# Patient Record
Sex: MALE | Race: WHITE | Employment: FULL TIME | URBAN - METROPOLITAN AREA
[De-identification: names, ages, dates, MRNs, and addresses within clinical notes are randomized per-mention and may not be internally consistent; named-entity substitution may affect disease eponyms.]

---

## 2022-03-03 ENCOUNTER — OFFICE VISIT (OUTPATIENT)
Dept: ORTHOPEDIC SURGERY | Age: 57
End: 2022-03-03
Payer: COMMERCIAL

## 2022-03-03 VITALS — WEIGHT: 195 LBS | BODY MASS INDEX: 27.92 KG/M2 | HEIGHT: 70 IN

## 2022-03-03 DIAGNOSIS — M25.562 LEFT KNEE PAIN, UNSPECIFIED CHRONICITY: ICD-10-CM

## 2022-03-03 DIAGNOSIS — M17.12 PRIMARY OSTEOARTHRITIS OF LEFT KNEE: Primary | ICD-10-CM

## 2022-03-03 PROCEDURE — 99204 OFFICE O/P NEW MOD 45 MIN: CPT | Performed by: ORTHOPAEDIC SURGERY

## 2022-03-03 RX ORDER — MELOXICAM 15 MG/1
15 TABLET ORAL DAILY PRN
Qty: 30 TABLET | Refills: 0 | Status: SHIPPED | OUTPATIENT
Start: 2022-03-03

## 2022-03-03 NOTE — PROGRESS NOTES
ORTHOPAEDIC SURGERY H&P / CONSULTATION NOTE    Chief complaint:   Chief Complaint   Patient presents with    Knee Pain     left knee pain        History of present illness: The patient is a 61 y.o. male with subjective symptoms of left knee pain. The chief complaint is located at lateral aspect left knee, occasional anterior aspect. Duration of symptoms has been for 10 days. The severity of symptoms is rated at 8/10 pain on intake form. Patient states that he had twisted his knee. He has discomfort and pain and has known osteoarthritis in the knee. He states pain with range of motion. He has been using Motrin and occasional brace use. He feels that he has had some discomfort with golfing and rowing. He is done formal physical therapy in the past.  He has had previous viscosupplementation injection but nothing of recent. The patient has tried the below listed items prior to today's consultation for above listed chief complaint.  +   Over-the-counter anti-inflammatories/prescription medication anti-inflammatory. -   Physical therapy / guided home exercise program of recent     -   Previous corticosteroid injections    Past medical history:    Past Medical History:   Diagnosis Date    Arthritis     knees    BBB (bundle branch block)     Wears glasses         Past surgical history:    Past Surgical History:   Procedure Laterality Date    KNEE ARTHROSCOPY Right 2007    VASECTOMY      WISDOM TOOTH EXTRACTION          Allergies:  No Known Allergies      Medications:   Current Outpatient Medications:     meloxicam (MOBIC) 15 MG tablet, Take 1 tablet by mouth daily as needed for Pain, Disp: 30 tablet, Rfl: 0    meloxicam (MOBIC) 15 MG tablet, Take 1 tablet by mouth daily, Disp: 30 tablet, Rfl: 0    ibuprofen (ADVIL;MOTRIN) 200 MG CAPS, Take 1 capsule by mouth, Disp: , Rfl:      Social history: Denies IV drug use.     Social History     Socioeconomic History    Marital status:      Spouse name: Not on file    Number of children: Not on file    Years of education: Not on file    Highest education level: Not on file   Occupational History    Not on file   Tobacco Use    Smoking status: Never Smoker    Smokeless tobacco: Never Used   Substance and Sexual Activity    Alcohol use: Yes     Comment: daily    Drug use: No    Sexual activity: Not on file   Other Topics Concern    Not on file   Social History Narrative    Not on file     Social Determinants of Health     Financial Resource Strain:     Difficulty of Paying Living Expenses: Not on file   Food Insecurity:     Worried About Running Out of Food in the Last Year: Not on file    Andre of Food in the Last Year: Not on file   Transportation Needs:     Lack of Transportation (Medical): Not on file    Lack of Transportation (Non-Medical): Not on file   Physical Activity:     Days of Exercise per Week: Not on file    Minutes of Exercise per Session: Not on file   Stress:     Feeling of Stress : Not on file   Social Connections:     Frequency of Communication with Friends and Family: Not on file    Frequency of Social Gatherings with Friends and Family: Not on file    Attends [employer] Services: Not on file    Active Member of [address] [place] [address] or Organizations: Not on file    Attends Club or Organization Meetings: Not on file    Marital Status: Not on file   Intimate Partner Violence:     Fear of Current or Ex-Partner: Not on file    Emotionally Abused: Not on file    Physically Abused: Not on file    Sexually Abused: Not on file   Housing Stability:     Unable to Pay for Housing in the Last Year: Not on file    Number of [place] in the Last Year: Not on file    Unstable Housing in the Last Year: Not on file     Tobacco use.     Social History     Tobacco Use   Smoking Status Never Smoker   Smokeless Tobacco Never Used     Employment: Noncontributory    Workers compensation claim: Noncontributory    Review of systems: Patient denies any fevers chills chest pain shortness of breath nausea vomiting significant weight loss any change in voiding or bowel movements. Patient denies any significant numbness or tingling at baseline as it relates to this presenting symptom/chief complaint. The patient denies any significant problems with skin or any significant allergies. Physical examination:  Body mass index is 27.98 kg/m². AAOx3, NCAT  EOMI  MMM  RR  Unlabored breathing, no wheezing  Skin intact BUE and BLE, warm and moist  Bilateral lower extremity examination specific to subjective symptoms  Exam Left Lower Extremity  Trace effusion,      0/120/0 active ROM (E/F/Lag), same P assive ROM (E/F/Lag), negative anterior Drawer, negative Posterior Drawer,  Stable varus/valgus at 0 and 30?,    none TTP Joint Line, negative Gabriela,    Skin intact throughout  5/5 IP Q H TA G EHL  SILT DP SP LP MP S S  +2 DP pulse    Diagnostic imaging:  MY READ:  Radiographs 4 view left knee 3/3/2022: Positive arthrosis mild to moderate patellofemoral greater than medial/lateral    Pertinent lab work: None     Diagnosis Orders   1. Primary osteoarthritis of left knee  meloxicam (MOBIC) 15 MG tablet    [facility]   2. Left knee pain, unspecified chronicity  XR KNEE LEFT (MIN 4 VIEWS)       Assessment and plan: 61 y.o. male with current subjective symptoms and physical exam findings with diagnostic imaging correlating to left knee osteoarthritis. -Time of 16 minutes was spent coordinating and discussing the clinical findings, reviewing diagnostic imaging as indicated, coordinating care with prior notes review and current clinical encounter documentation as it pertains to the patient's presenting subjective symptoms and diagnoses. -I reviewed with the patient the imaging findings as well as clinical exam and  how it correlates to subjective symptoms.  -I had a pleasant discussion with the patient today.   I reviewed with him directly his radiographs. He does have mild to moderate patellofemoral arthrosis greater than medial/lateral  -I reviewed with him conservative care treatment options. At this time we will attempt to address this with Mobic 15 mg p.o. daily as needed pain  -Formal physical therapy is also been prescribed to work on NADINE knee reconditioning and strengthening.  -Pending the results of conservative care, I did review with him consideration for viscosupplementation injection therapy given the moderate nature of his knee osteoarthritis precludes the likely efficacy of leukocyte poor PRP injection therapy. I also did not advocate for corticosteroid injections at this time.  -He is going to keep an eye on his symptoms and as well as low impact activity elliptical stationary bike swimming and walking. Pending the results of this, he will contact the office in 4 to 6 weeks for consideration of viscosupplementation injection therapy series to be submitted to insurance for authorization  -All questions answered to the patient's satisfaction and the patient expressed understanding and agreement with the above listed treatment plan  -Follow up in 4 to 6 weeks as needed  -Thank you for the clinical consultation and allowing me to participate in the patient's care. Electronically signed by Kingsley Myles MD on 3/3/22 at 5:01 PM EST         Kingsley Myles MD       Orthopaedic Surgery-Sports Medicine        Disclaimer: This note was dictated with voice recognition software. Though review and correction are routinely performed, please contact the office/medical records for any errors requiring correction.

## 2022-03-07 ENCOUNTER — HOSPITAL ENCOUNTER (OUTPATIENT)
Dept: PHYSICAL THERAPY | Age: 57
Setting detail: THERAPIES SERIES
Discharge: HOME | End: 2022-03-07
Payer: COMMERCIAL

## 2022-03-07 PROCEDURE — 97110 THERAPEUTIC EXERCISES: CPT | Performed by: PHYSICAL THERAPIST

## 2022-03-07 PROCEDURE — 97161 PT EVAL LOW COMPLEX 20 MIN: CPT | Performed by: PHYSICAL THERAPIST

## 2022-03-07 NOTE — FLOWSHEET NOTE
[facility],  [address]  Phone: [phone]  Fax [phone]    Physical Therapy Treatment Note/ Progress Report:           Date:  3/7/2022    Patient Name:  Francis Rueda    :  1965  MRN: [id number]  Restrictions/Precautions:    Medical/Treatment Diagnosis Information:  · Diagnosis: M17.12 (ICD-10-CM) - Primary osteoarthritis of left knee  ·   Treatment diagnosis:Left knee pain M25.562, Left knee stiffness M25.662, impaired gait U34.9  Insurance/Certification information:  PT Insurance Information:  [address] DED$500 80/20% 30PT/OT/SP NO AUTH [place] MED  Physician Information:  Referring Practitioner: Jasmina Yuen MD  Has the plan of care been signed (Y/N):        []  Yes  [x]  No     Date of Patient follow up with Physician: PRN      Is this a Progress Report:     []  Yes  [x]  No        If Yes:  Date Range for reporting period:  Beginning 3/7/22  Ending    Progress report will be due (10 Rx or 30 days whichever is less): 48       Recertification will be due (POC Duration  / 90 days whichever is less): 6 weeks      Visit # Insurance Allowable Auth Required   In-person 1 30PT []  Yes [x]  No    Telehealth   []  Yes []  No    Total          Functional Scae: LEFS 73/80 9%    Date assessed:  3/7/22      Therapy Diagnosis/Practice Pattern:C      Number of Comorbidities:  []0     [x]1-2    []3+    Latex Allergy:  [x]NO      []YES  Preferred Language for Healthcare:   [x]English       []other:      Pain level:  0-4/10     SUBJECTIVE:  See eval    OBJECTIVE: See eval   Observation:    Test measurements:      RESTRICTIONS/PRECAUTIONS: OA    Exercises/Interventions:     Access Code: Rajwinder Adrian  URL: [email or web address]  Date: 2022  Prepared by: Bonifacio Granda    Exercises  Supine Hamstring Stretch with Strap - 2 x daily - 7 x weekly - 1 sets - 4 reps - 30 hold  Supine ITB Stretch with Strap - 2 x daily - 7 x weekly - 1 sets - 4 reps - 30 hold  Supine Quadriceps Stretch with Strap on Table - 2 x daily - 7 x weekly - 1 sets - 4 reps - 30 hold  Supine Figure 4 Piriformis Stretch - 2 x daily - 7 x weekly - 3 sets - 4 reps - 30 hold  Supine Piriformis Stretch with Leg Straight - 2 x daily - 7 x weekly - 1 sets - 4 reps - 30 hold  Supine Straight Leg Raises - 2 x daily - 7 x weekly - 3 sets - 10 reps  Sidelying Hip Abduction - 2 x daily - 7 x weekly - 3 sets - 10 reps  Prone Hip Extension with Bent Knee - 2 x daily - 7 x weekly - 3 sets - 10 reps      Therapeutic Ex (15874) Sets/sec Reps Notes/CUES   Supine Hamstring stretch :30 4    Supine lat hamstring stretch :30 4    Supine ITB stretch :30 4    Supine hip ER :30 4    Supine KTOS :30 4    Supine modified Prasanna' with strap :30 4                Supine SLR F, abd 1 10    Prone hip ext with knee flexed 1 10                                        Manual Intervention (88034)      Mobs for pat, knee E/F NV     LLP gr IV-V hip X     LLP gr V sacral X  Sup>sit = after                     NMR re-education (42815)   CUES NEEDED                                                         Therapeutic Activity (60471)                                                Therapeutic Exercise and NMR EXR  [x] (06599) Provided verbal/tactile cueing for activities related to strengthening, flexibility, endurance, ROM for improvements in LE, proximal hip, and core control with self care, mobility, lifting, ambulation.  [] (20357) Provided verbal/tactile cueing for activities related to improving balance, coordination, kinesthetic sense, posture, motor skill, proprioception  to assist with LE, proximal hip, and core control in self care, mobility, lifting, ambulation and eccentric single leg control.      NMR and Therapeutic Activities:    [] (33143 or ) Provided verbal/tactile cueing for activities related to improving balance, coordination, kinesthetic sense, posture, motor skill, proprioception and motor activation to allow for proper function of core, proximal hip and LE with self care and ADLs  [] (63233) Gait Re-education- Provided training and instruction to the patient for proper LE, core and proximal hip recruitment and positioning and eccentric body weight control with ambulation re-education including up and down stairs     Home Exercise Program:    [x] (34219) Reviewed/Progressed HEP activities related to strengthening, flexibility, endurance, ROM of core, proximal hip and LE for functional self-care, mobility, lifting and ambulation/stair navigation   [] (24469)Reviewed/Progressed HEP activities related to improving balance, coordination, kinesthetic sense, posture, motor skill, proprioception of core, proximal hip and LE for self care, mobility, lifting, and ambulation/stair navigation      Manual Treatments:  PROM / STM / Oscillations-Mobs:  G-I, II, III, IV (PA's, Inf., Post.)  [] (16667) Provided manual therapy to mobilize LE, proximal hip and/or LS spine soft tissue/joints for the purpose of modulating pain, promoting relaxation,  increasing ROM, reducing/eliminating soft tissue swelling/inflammation/restriction, improving soft tissue extensibility and allowing for proper ROM for normal function with self care, mobility, lifting and ambulation. Modalities:     [] GAME READY (VASO)- for significant edema, swelling, pain control.      Charges  Timed Code Treatment Minutes: 15   Total Treatment Minutes: 40     Medicare total (add KX at $2000)         BWC time in/ time out:    TE time in /out    Manual time in/out    Neuro re ed time in/out    Untimed minutes        [x] EVAL (LOW) 85333   [] EVAL (MOD) 56029  [] EVAL (HIGH) 20636   [] RE-EVAL     [x] ZZ(96624) x  1   [] IONTO  [] NMR (50585) x     [] VASO  [] Manual (57883) x      [] Other:  [] TA x      [] Mech Traction (67148)  [] ES(attended) (43740)      [] ES (un) (66102): GOALS:   Patient stated goal: play golf w/o pain, work out/be active w/o pain  [] Progressing: [] Met: [] Not Met: [] Adjusted    Therapist goals for Patient:   Short Term Goals: To be achieved in: 2 weeks  1. Independent in HEP and progression per patient tolerance, in order to prevent re-injury. [] Progressing: [] Met: [] Not Met: [] Adjusted  2. Patient will have a decrease in pain to facilitate improvement in movement, function, and ADLs as indicated by Functional Deficits. [] Progressing: [] Met: [] Not Met: [] Adjusted    Long Term Goals: To be achieved in: 6 weeks  1. Disability index score of 5% or less for the LEFS to assist with reaching prior level of function. [] Progressing: [] Met: [] Not Met: [] Adjusted  2. Patient will demonstrate increased AROM to L knee 0-130 to allow for proper joint functioning as indicated by patients Functional Deficits. [] Progressing: [] Met: [] Not Met: [] Adjusted  3. Patient will demonstrate an increase in Strength to good proximal hip strength and control, within 5lb HHD in LE and normalize patellar tracking to allow for proper functional mobility as indicated by patients Functional Deficits. [] Progressing: [] Met: [] Not Met: [] Adjusted  4. Patient will return to stair climbing and walking on unlevel terrain functional activities without increased symptoms or restriction. [] Progressing: [] Met: [] Not Met: [] Adjusted  5. Pt. To be able to return to maintenance ex. Program for LE including squats without limitation.(patient specific functional goal)    [] Progressing: [] Met: [] Not Met: [] Adjusted       Progression Towards Functional goals:  [] Patient is progressing as expected towards functional goals listed. [] Progression is slowed due to complexities listed. [] Progression has been slowed due to co-morbidities.   [x] Plan just implemented, too soon to assess goals progression  [] Other:         Overall Progression Towards Functional goals/ Treatment Progress Update:  [] Patient is progressing as expected towards functional goals listed. [] Progression is slowed due to complexities/Impairments listed. [] Progression has been slowed due to co-morbidities. [x] Plan just implemented, too soon to assess goals progression <30days   [] Goals require adjustment due to lack of progress  [] Patient is not progressing as expected and requires additional follow up with physician  [] Other    Prognosis for POC: [x] Good [] Fair  [] Poor      Patient requires continued skilled intervention: [x] Yes  [] No    Treatment/Activity Tolerance:  [x] Patient able to complete treatment  [] Patient limited by fatigue  [] Patient limited by pain    [] Patient limited by other medical complications  [] Other:     ASSESSMENT: see eval    Return to Play: (if applicable)   []  Stage 1: Intro to Strength   []  Stage 2: Return to Run and Strength   []  Stage 3: Return to Jump and Strength   []  Stage 4: Dynamic Strength and Agility   []  Stage 5: Sport Specific Training     []  Ready to Return to Play, Meets All Above Stages   []  Not Ready for Return to Sports   Comments:                               PLAN: See eval  [] Continue per plan of care [] Alter current plan (see comments above)  [x] Plan of care initiated [] Hold pending MD visit [] Discharge      Electronically signed by:  Kristine Kraft, PT, MSPT, OMT-C [id number]      Note: If patient does not return for scheduled/ recommended follow up visits, this note will serve as a discharge from care along with most recent update on progress.

## 2022-03-07 NOTE — PLAN OF CARE
[facility], [address]  Phone: [phone]  Fax [phone]     Physical Therapy Certification    Dear Referring Practitioner: Jasmina Yuen MD,    We had the pleasure of evaluating the following patient for physical therapy services at [address] [place] [address]. A summary of our findings can be found in the initial assessment below. This includes our plan of care. If you have any questions or concerns regarding these findings, please do not hesitate to contact me at the office phone number checked above. Thank you for the referral.       Physician Signature:_______________________________Date:__________________  By signing above (or electronic signature), therapists plan is approved by physician    Patient: Francis Rueda   : 1965   MRN: [id number]  Referring Physician: Referring Practitioner: aJsmina Yuen MD      Evaluation Date: 3/7/2022      Medical Diagnosis Information:  Diagnosis: M17.12 (ICD-10-CM) - Primary osteoarthritis of left knee    Treatment diagnosis:Left knee pain M25.562, Left knee stiffness M25.662, impaired gait R26.9                                            Insurance information: PT Insurance Information:  [facility] DED$500 80/20% 30PT/OT/SP NO AUTH YES TELE MED       Precautions/ Contra-indications: OA    C-SSRS Triggered by Intake questionnaire (Past 2 wk assessment):   [x] No, Questionnaire did not trigger screening.   [] Yes, Patient intake triggered further evaluation      [] C-SSRS Screening completed  [] PCP notified via Plan of Care  [] Emergency services notified     Latex Allergy:  [x]NO      []YES  Preferred Language for Healthcare:   [x]English       []other:    SUBJECTIVE: Patient stated complaint:Pt reports that a couple weeks ago he had onset of L knee pain following a twisting activity maybe while playing golf.   The pain was located laterally and kept him awake at night. He still has pain with going down steps, walking the dog and maybe with twisting. He generally takes Motrin for prophylactic reasons, with h/o B knee meniscus surgery. Pt. Reports that he has not been as active lately in doing ex. And thinks this factors in with his recent limitations. Relevant Medical History:OA, h/o DDD  Functional Disability Index: L    Height 5'10\" [id number] lb  Pain Scale: 0-10  Easing factors: taking Motrin, activity modification  Provocative factors: pivoting, going down steps     Type: []Constant   []Intermittent  []Radiating []Localized []other:     Numbness/Tingling: none    Occupation/School:office work-investing  Living Status/Prior Level of Function: Independent with ADLs and IADLs, golf, walking, rowing machine, biking, lives in home with 3 flights of steps-these were all tolerable prior to recent onset of pain episode.     OBJECTIVE:     ROM LEFT RIGHT   HIP Flex     HIP Abd     HIP Ext     HIP IR 13 15   HIP ER 47 45   Knee ext 0 0   Knee Flex 122 132   Ankle PF     Ankle DF     Ankle In     Ankle Ev     Strength  LEFT RIGHT   HIP Flexors 4 5   HIP Abductors 4-4+    HIP Ext 4    Hip ER     Knee EXT (quad) 5 LAQ, VMO 4    Knee Flex (HS) 5 5   Ankle DF 5 5   Ankle PF 5 5   Ankle Inv 5 5   Ankle EV 5 5   SLB :6 :10   Circumference  Mid apex  7 cm prox             Reflexes/Sensation:    [x]Dermatomes/Myotomes R L4 with decreased sensation to light touch R thigh   [x]Reflexes equal and normal L, 0 R patellar reflex   []Other:    Joint mobility:    []Normal    [x]Hypo L knee   []Hyper    Palpation: L medial HS insertion    Functional Mobility/Transfers: independent    Posture: min genu varus L    Bandages/Dressings/Incisions:     Gait: (include devices/WB status) WNL    Orthopedic Special Tests: supine>sit L long to short, standing crest high L, PSIS high L, (+)FF L, (+)patellar grind, (+)apprehension, (-)Gabriela, (+)LTP L [x] Patient history, allergies, meds reviewed. Medical chart reviewed. See intake form. Review Of Systems (ROS):  [x]Performed Review of systems (Integumentary, CardioPulmonary, Neurological) by intake and observation. Intake form has been scanned into medical record. Patient has been instructed to contact their primary care physician regarding ROS issues if not already being addressed at this time. Co-morbidities/Complexities (which will affect course of rehabilitation):   []None           Arthritic conditions   []Rheumatoid arthritis (M05.9)  [x]Osteoarthritis (M19.91)   Cardiovascular conditions   []Hypertension (I10)  []Hyperlipidemia (E78.5)  []Angina pectoris (I20)  []Atherosclerosis (I70)   Musculoskeletal conditions   []Disc pathology   []Congenital spine pathologies   []Prior surgical intervention  []Osteoporosis (M81.8)  []Osteopenia (M85.8)   Endocrine conditions   []Hypothyroid (E03.9)  []Hyperthyroid Gastrointestinal conditions   []Constipation (E54.91)   Metabolic conditions   []Morbid obesity (E66.01)  []Diabetes type 1(E10.65) or 2 (E11.65)   []Neuropathy (G60.9)     Pulmonary conditions   []Asthma (J45)  []Coughing   []COPD (J44.9)   Psychological Disorders  []Anxiety (F41.9)  []Depression (F32.9)   []Other:   []Other:          Barriers to/and or personal factors that will affect rehab potential:              []Age  []Sex              []Motivation/Lack of Motivation                        [x]Co-Morbidities              []Cognitive Function, education/learning barriers              []Environmental, home barriers              []profession/work barriers  []past PT/medical experience  []other:  Justification: pt. Tolerated eval well and has had some relief with use of NSAID, should progress well with PT    Falls Risk Assessment (30 days):   [x] Falls Risk assessed and no intervention required.   [] Falls Risk assessed and Patient requires intervention due to being higher risk   TUG score (>12s at risk):     [] Falls education provided, including           ASSESSMENT:   Functional Impairments:     [x]Noted lumbar/proximal hip/LE joint hypomobility   [x]Decreased LE functional ROM   [x]Decreased core/proximal hip strength and neuromuscular control   []Decreased LE functional strength   [x]Reduced balance/proprioceptive control   []other:      Functional Activity Limitations (from functional questionnaire and intake)   []Reduced ability to tolerate prolonged functional positions   []Reduced ability or difficulty with changes of positions or transfers between positions   []Reduced ability to maintain good posture and demonstrate good body mechanics with sitting, bending, and lifting   []Reduced ability to sleep   [] Reduced ability or tolerance with driving and/or computer work   [x]Reduced ability to perform lifting, carrying tasks   [x]Reduced ability to squat   []Reduced ability to forward bend   []Reduced ability to ambulate prolonged functional periods/distances/surfaces   [x]Reduced ability to ascend/descend stairs   []Reduced ability to run, hop, cut or jump   [x]other:kneeling    Participation Restrictions   []Reduced participation in self care activities   []Reduced participation in home management activities   []Reduced participation in work activities   []Reduced participation in social activities. [x]Reduced participation in sport/recreation activities. Classification :    []Signs/symptoms consistent with post-surgical status including decreased ROM, strength and function.    []Signs/symptoms consistent with joint sprain/strain   []Signs/symptoms consistent with patella-femoral syndrome   [x]Signs/symptoms consistent with knee OA/hip OA   []Signs/symptoms consistent with internal derangement of knee/Hip   []Signs/symptoms consistent with functional hip weakness/NMR control      []Signs/symptoms consistent with tendinitis/tendinosis    []signs/symptoms consistent with pathology which may benefit from Dry needling      []other:      Prognosis/Rehab Potential:      []Excellent   [x]Good    []Fair   []Poor    Tolerance of evaluation/treatment:    []Excellent   [x]Good    []Fair   []Poor  Physical Therapy Evaluation Complexity Justification  [x] A history of present problem with:  [] no personal factors and/or comorbidities that impact the plan of care;  [x]1-2 personal factors and/or comorbidities that impact the plan of care  []3 personal factors and/or comorbidities that impact the plan of care  [x] An examination of body systems using standardized tests and measures addressing any of the following: body structures and functions (impairments), activity limitations, and/or participation restrictions;:  [] a total of 1-2 or more elements   [x] a total of 3 or more elements   [] a total of 4 or more elements   [x] A clinical presentation with:  [x] stable and/or uncomplicated characteristics   [] evolving clinical presentation with changing characteristics  [] unstable and unpredictable characteristics;   [x] Clinical decision making of [x] low, [] moderate, [] high complexity using standardized patient assessment instrument and/or measurable assessment of functional outcome. [x] EVAL (LOW) 02468 (typically 20 minutes face-to-face)  [] EVAL (MOD) 11641 (typically 30 minutes face-to-face)  [] EVAL (HIGH) 90296 (typically 45 minutes face-to-face)  [] RE-EVAL       PLAN:   Frequency/Duration:  1-2 days per week for 4-6 Weeks:  Interventions:  [x]  Therapeutic exercise including: strength training, ROM, for Lower extremity and core   [x]  NMR activation and proprioception for LE, Glutes and Core   [x]  Manual therapy as indicated for LE, Hip and spine to include: Dry Needling/IASTM, STM, PROM, Gr I-IV mobilizations, manipulation.    [x] Modalities as needed that may include: thermal agents, E-stim, Biofeedback, US, iontophoresis as indicated  [x] Patient education on joint protection, postural

## 2022-03-17 ENCOUNTER — HOSPITAL ENCOUNTER (OUTPATIENT)
Dept: PHYSICAL THERAPY | Age: 57
Setting detail: THERAPIES SERIES
Discharge: HOME | End: 2022-03-17
Payer: COMMERCIAL

## 2022-03-17 PROCEDURE — 97140 MANUAL THERAPY 1/> REGIONS: CPT

## 2022-03-17 PROCEDURE — 97112 NEUROMUSCULAR REEDUCATION: CPT

## 2022-03-17 PROCEDURE — 97110 THERAPEUTIC EXERCISES: CPT

## 2022-03-17 NOTE — FLOWSHEET NOTE
[facility], [address] [place] [facility]  Phone: [phone]  Fax [phone]    Physical Therapy Treatment Note/ Progress Report:           Date:  3/17/2022    Patient Name:  Steve Baca    :  1965  MRN: [id number]  Restrictions/Precautions:    Medical/Treatment Diagnosis Information:  · Diagnosis: M17.12 (ICD-10-CM) - Primary osteoarthritis of left knee  ·   Treatment diagnosis:Left knee pain M25.562, Left knee stiffness M25.662, impaired gait R46.0  Insurance/Certification information:  PT Insurance Information:  [address] DED$500 80/20% 30PT/OT/SP NO AUTH [facility]  Physician Information:  Referring Practitioner: Kingsley Myles MD  Has the plan of care been signed (Y/N):        []  Yes  [x]  No     Date of Patient follow up with Physician: PRN      Is this a Progress Report:     []  Yes  [x]  No        If Yes:  Date Range for reporting period:  Beginning 3/7/22  Ending    Progress report will be due (10 Rx or 30 days whichever is less): 51       Recertification will be due (POC Duration  / 90 days whichever is less): 6 weeks      Visit # Insurance Allowable Auth Required   In-person 2 30PT []  Yes [x]  No    Telehealth   []  Yes []  No    Total          Functional Scae: LEFS 73/80 9%    Date assessed:  3/7/22      Therapy Diagnosis/Practice Pattern:C      Number of Comorbidities:  []0     [x]1-2    []3+    Latex Allergy:  [x]NO      []YES  Preferred Language for Healthcare:   [x]English       []other:      Pain level:  0/10     SUBJECTIVE:  Pt states he feels great since adding the HEP stretches, and he's had no pain in his knee which he attributes to the NSAID. He wants to work on stability exercise to help his core/hips as indicated from deficits at init eval. He did feel that the leg pull really helped comfort level last visit.     OBJECTIVE:    Observation: HS comp with hip ext R>L, multif com with hip ext L>R; - Laslett's cluster, denies TTP at knee, mostly tightness in ant and lat hips   Test measurements:      RESTRICTIONS/PRECAUTIONS: OA    Exercises/Interventions:     Access Code: Ro Zimmer  URL: [email or web address]  Date: 03/17/2022  Prepared by: Arsenio Dubois    Exercises  Supine Hamstring Stretch with Strap - 2 x daily - 7 x weekly - 1 sets - 4 reps - 30 hold  Supine ITB Stretch with Strap - 2 x daily - 7 x weekly - 1 sets - 4 reps - 30 hold  Supine Quadriceps Stretch with Strap on Table - 2 x daily - 7 x weekly - 1 sets - 4 reps - 30 hold  Supine Figure 4 Piriformis Stretch - 2 x daily - 7 x weekly - 3 sets - 4 reps - 30 hold  Supine Piriformis Stretch with Leg Straight - 2 x daily - 7 x weekly - 1 sets - 4 reps - 30 hold  Supine Straight Leg Raises - 2 x daily - 7 x weekly - 3 sets - 10 reps  Sidelying Hip Abduction - 2 x daily - 7 x weekly - 3 sets - 10 reps  Prone Hip Extension with Bent Knee - 2 x daily - 7 x weekly - 3 sets - 10 reps  Half Kneeling Hip Flexor Stretch with Sidebend - 2 x daily - 7 x weekly - 2 reps - 30 hold  Prone Gluteal Sets - 3 x weekly - 5 reps - 5 hold  Prone Terminal Knee Extension - 3 x weekly - 3 reps - 3 hold  Prone Hip Extension - 3 x weekly - 5 reps - 3 hold  Bird Dog - 1 x daily - 3 x weekly - 5 reps - 10 hold  Quadruped Yoga Block Lift Off - 1 x daily - 3 x weekly - 15 reps  Standing Hip Abduction on Slider - 1 x daily - 3 x weekly - 15 reps      Therapeutic Ex (54869) Sets/sec Reps Notes/CUES   Supine Hamstring stretch Verb review only today   Supine lat hamstring stretch \" \" \"   Supine ITB stretch :30 4    Supine hip ER :30 4 \" \" \"   Supine KTOS :30 4    Supine modified Chicas ' with strap Verb reivew only today   1/2 kneel hip flexor/psoas S (lunge + lean) :30 2 R, L Cue for slight PPT to stabilize pelvis         Supine SLR F, abd Contin for HEP   Prone hip ext with knee flexed  \" \" \"   Glider abd, ext diag 1 ea 15 ea Fingertip UE supp, cue for upright trunk   Quadruped hip ext  Quadruped bird dog :5  :10 x10 R, L  x5 alt R, L Mirror for feedback for neutral spine pos                           Manual Intervention ([phone])      Mobs for pat, knee E/F NV     LLP gr IV-V hip X  1'x2 R, L   LLP gr V sacral X  Sup>sit = before and after  1' R, L  MFR leg pull S to ITB<>adductors 1' R, L ea                 NMR re-education (90465)   CUES NEEDED   Prone glute retraining:B glute  R, L glute iso  + TKE  + hip ext      Quadruped multif activ hip hike  + on 1/2 FR to inc ROM   Mirror for feedback for neutral spine pos                                             Therapeutic Activity (65415)                                                Therapeutic Exercise and NMR EXR  [x] (06306) Provided verbal/tactile cueing for activities related to strengthening, flexibility, endurance, ROM for improvements in LE, proximal hip, and core control with self care, mobility, lifting, ambulation.  [] (97617) Provided verbal/tactile cueing for activities related to improving balance, coordination, kinesthetic sense, posture, motor skill, proprioception  to assist with LE, proximal hip, and core control in self care, mobility, lifting, ambulation and eccentric single leg control.      NMR and Therapeutic Activities:    [x] (64059 or 80811) Provided verbal/tactile cueing for activities related to improving balance, coordination, kinesthetic sense, posture, motor skill, proprioception and motor activation to allow for proper function of core, proximal hip and LE with self care and ADLs  [] (66563) Gait Re-education- Provided training and instruction to the patient for proper LE, core and proximal hip recruitment and positioning and eccentric body weight control with ambulation re-education including up and down stairs     Home Exercise Program:    [x] (87814) Reviewed/Progressed HEP activities related to strengthening, flexibility, endurance, ROM of core, proximal hip and LE for functional self-care, mobility, lifting and ambulation/stair navigation   [] (02149)Reviewed/Progressed HEP activities related to improving balance, coordination, kinesthetic sense, posture, motor skill, proprioception of core, proximal hip and LE for self care, mobility, lifting, and ambulation/stair navigation      Manual Treatments:  PROM / STM / Oscillations-Mobs:  G-I, II, III, IV (PA's, Inf., Post.)  [x] (80620) Provided manual therapy to mobilize LE, proximal hip and/or LS spine soft tissue/joints for the purpose of modulating pain, promoting relaxation,  increasing ROM, reducing/eliminating soft tissue swelling/inflammation/restriction, improving soft tissue extensibility and allowing for proper ROM for normal function with self care, mobility, lifting and ambulation. Modalities:     [] GAME READY (VASO)- for significant edema, swelling, pain control. Charges  Timed Code Treatment Minutes: 45   Total Treatment Minutes: 45     Medicare total (add KX at $2000)         BWC time in/ time out:    TE time in /out    Manual time in/out    Neuro re ed time in/out    Untimed minutes        [] EVAL (LOW) 66438   [] EVAL (MOD) 20785  [] EVAL (HIGH) 78102   [] RE-EVAL     [x] SP(38798) x  1   [] IONTO  [x] NMR (37105) x1     [] VASO  [x] Manual (36778) x 1     [] Other:  [] TA x      [] Mech Traction (65666)  [] ES(attended) (88680)      [] ES (un) (30942):       GOALS:   Patient stated goal: play golf w/o pain, work out/be active w/o pain  [] Progressing: [] Met: [] Not Met: [] Adjusted    Therapist goals for Patient:   Short Term Goals: To be achieved in: 2 weeks  1. Independent in HEP and progression per patient tolerance, in order to prevent re-injury. [] Progressing: [] Met: [] Not Met: [] Adjusted  2. Patient will have a decrease in pain to facilitate improvement in movement, function, and ADLs as indicated by Functional Deficits. [] Progressing: [] Met: [] Not Met: [] Adjusted    Long Term Goals:  To be No    Treatment/Activity Tolerance:  [x] Patient able to complete treatment  [] Patient limited by fatigue  [] Patient limited by pain    [] Patient limited by other medical complications  [] Other:     ASSESSMENT: Pt had no c/o knee pain entering this visit and requested to work on exercises for more proximal stability, so showed glute retraining and progressions in quadruped to also promote isolation of low back/glutes/hamstrings. Did not work on joint mobilization to aid knee flexion deficits, so can focus on this NV. Return to Play: (if applicable)   []  Stage 1: Intro to Strength   []  Stage 2: Return to Run and Strength   []  Stage 3: Return to Jump and Strength   []  Stage 4: Dynamic Strength and Agility   []  Stage 5: Sport Specific Training     []  Ready to Return to Play, Meets All Above Stages   []  Not Ready for Return to Sports   Comments:                               PLAN: Add tib/fem and pat mobs to inc knee flexion mobility. [x] Continue per plan of care [] Alter current plan (see comments above)  [] Plan of care initiated [] Hold pending MD visit [] Discharge      Electronically signed by:  Laurie Ribeiro, PT, DPT      Note: If patient does not return for scheduled/ recommended follow up visits, this note will serve as a discharge from care along with most recent update on progress.

## 2022-03-18 ENCOUNTER — HOSPITAL ENCOUNTER (OUTPATIENT)
Dept: PHYSICAL THERAPY | Age: 57
Setting detail: THERAPIES SERIES
End: 2022-03-18
Payer: COMMERCIAL

## 2022-03-18 ENCOUNTER — HOSPITAL ENCOUNTER (OUTPATIENT)
Dept: PHYSICAL THERAPY | Age: 57
Setting detail: THERAPIES SERIES
Discharge: HOME | End: 2022-03-18
Payer: COMMERCIAL

## 2022-03-18 PROCEDURE — 97112 NEUROMUSCULAR REEDUCATION: CPT | Performed by: PHYSICAL THERAPIST

## 2022-03-18 PROCEDURE — 97140 MANUAL THERAPY 1/> REGIONS: CPT | Performed by: PHYSICAL THERAPIST

## 2022-03-18 NOTE — FLOWSHEET NOTE
[facility], [address]  Phone: [phone]  Fax [phone]    Physical Therapy Treatment Note/ Progress Report:           Date:  3/18/2022    Patient Name:  Arnol Bazan    :  1965  MRN: [id number]  Restrictions/Precautions:    Medical/Treatment Diagnosis Information:  · Diagnosis: M17.12 (ICD-10-CM) - Primary osteoarthritis of left knee  ·   Treatment diagnosis:Left knee pain M25.562, Left knee stiffness M25.662, impaired gait K96.7  Insurance/Certification information:  PT Insurance Information:  [address] DED$500 80/20% 30PT/OT/SP NO AUTH Sade NELSON  Physician Information:  Referring Practitioner: Farideh Mercado MD  Has the plan of care been signed (Y/N):        []  Yes  [x]  No     Date of Patient follow up with Physician: PRN      Is this a Progress Report:     []  Yes  [x]  No        If Yes:  Date Range for reporting period:  Beginning 3/7/22  Ending    Progress report will be due (10 Rx or 30 days whichever is less): 5/5/10       Recertification will be due (POC Duration  / 90 days whichever is less): 6 weeks      Visit # Insurance Allowable Auth Required   In-person 3 30PT []  Yes [x]  No    Telehealth   []  Yes []  No    Total          Functional Scae: LEFS 73/80 9%    Date assessed:  3/7/22      Therapy Diagnosis/Practice Pattern:C      Number of Comorbidities:  []0     [x]1-2    []3+    Latex Allergy:  [x]NO      []YES  Preferred Language for Healthcare:   [x]English       []other:      Pain level:  0/10     SUBJECTIVE:  Pt. Notes that the knee is feeling better, no issues with ex. From yesterday, had some achiness after walking the golf course and notes that the knees still feel \"weak\".     OBJECTIVE:    Observation: restriction R ant hip>L, restriction noted L QL today   Test measurements:   L crest and PSIS high in standing and sitting, (+)sit>sup R long to even    RESTRICTIONS/PRECAUTIONS: OA    Exercises/Interventions:     Access Code: Steve Fresh  URL: [email or web address]  Date: 03/17/2022  Prepared by: Dagmar Peterson    Exercises  Supine Hamstring Stretch with Strap - 2 x daily - 7 x weekly - 1 sets - 4 reps - 30 hold  Supine ITB Stretch with Strap - 2 x daily - 7 x weekly - 1 sets - 4 reps - 30 hold  Supine Quadriceps Stretch with Strap on Table - 2 x daily - 7 x weekly - 1 sets - 4 reps - 30 hold  Supine Figure 4 Piriformis Stretch - 2 x daily - 7 x weekly - 3 sets - 4 reps - 30 hold  Supine Piriformis Stretch with Leg Straight - 2 x daily - 7 x weekly - 1 sets - 4 reps - 30 hold  Supine Straight Leg Raises - 2 x daily - 7 x weekly - 3 sets - 10 reps  Sidelying Hip Abduction - 2 x daily - 7 x weekly - 3 sets - 10 reps  Prone Hip Extension with Bent Knee - 2 x daily - 7 x weekly - 3 sets - 10 reps  Half Kneeling Hip Flexor Stretch with Sidebend - 2 x daily - 7 x weekly - 2 reps - 30 hold  Prone Gluteal Sets - 3 x weekly - 5 reps - 5 hold  Prone Terminal Knee Extension - 3 x weekly - 3 reps - 3 hold  Prone Hip Extension - 3 x weekly - 5 reps - 3 hold  Bird Dog - 1 x daily - 3 x weekly - 5 reps - 10 hold  Quadruped Yoga Block Lift Off - 1 x daily - 3 x weekly - 15 reps  Standing Hip Abduction on Slider - 1 x daily - 3 x weekly - 15 reps      Therapeutic Ex (58388) Sets/sec Reps Notes/CUES   Supine Hamstring stretch Verb review only today   Supine lat hamstring stretch \" \" \"   Supine ITB stretch    Supine hip ER \" \" \"   Supine KTOS    Supine modified Rogene Custard' with strap Verb reivew only today   1/2 kneel hip flexor/psoas S (lunge + lean) Cue for slight PPT to stabilize pelvis         Supine SLR F, abd Contin for HEP   Prone hip ext with knee flexed  \" \" \"   Glider abd, ext diag Fingertip UE supp, cue for upright trunk   Quadruped hip ext  Quadruped bird dog Mirror for feedback for neutral spine pos                           Manual Intervention ([phone]) ambulation/stair navigation   [] (39283)Reviewed/Progressed HEP activities related to improving balance, coordination, kinesthetic sense, posture, motor skill, proprioception of core, proximal hip and LE for self care, mobility, lifting, and ambulation/stair navigation      Manual Treatments:  PROM / STM / Oscillations-Mobs:  G-I, II, III, IV (PA's, Inf., Post.)  [x] (01205) Provided manual therapy to mobilize LE, proximal hip and/or LS spine soft tissue/joints for the purpose of modulating pain, promoting relaxation,  increasing ROM, reducing/eliminating soft tissue swelling/inflammation/restriction, improving soft tissue extensibility and allowing for proper ROM for normal function with self care, mobility, lifting and ambulation. Modalities:     [] GAME READY (VASO)- for significant edema, swelling, pain control. Charges  Timed Code Treatment Minutes: 30   Total Treatment Minutes: 30     Medicare total (add KX at $2000)         BWC time in/ time out:    TE time in /out    Manual time in/out    Neuro re ed time in/out    Untimed minutes        [] EVAL (LOW) 62354   [] EVAL (MOD) 93596  [] EVAL (HIGH) 23740   [] RE-EVAL     [] AP(21980) x  1   [] IONTO  [x] NMR (66066) x1     [] VASO  [x] Manual (37127) x 1     [] Other:  [] TA x      [] Mech Traction (39630)  [] ES(attended) (13696)      [] ES (un) (81774):       GOALS:   Patient stated goal: play golf w/o pain, work out/be active w/o pain  [] Progressing: [] Met: [] Not Met: [] Adjusted    Therapist goals for Patient:   Short Term Goals: To be achieved in: 2 weeks  1. Independent in HEP and progression per patient tolerance, in order to prevent re-injury. [] Progressing: [] Met: [] Not Met: [] Adjusted  2. Patient will have a decrease in pain to facilitate improvement in movement, function, and ADLs as indicated by Functional Deficits. [] Progressing: [] Met: [] Not Met: [] Adjusted    Long Term Goals: To be achieved in: 6 weeks  1.  Disability index score of 5% or less for the LEFS to assist with reaching prior level of function. [] Progressing: [] Met: [] Not Met: [] Adjusted  2. Patient will demonstrate increased AROM to L knee 0-130 to allow for proper joint functioning as indicated by patients Functional Deficits. [] Progressing: [] Met: [] Not Met: [] Adjusted  3. Patient will demonstrate an increase in Strength to good proximal hip strength and control, within 5lb HHD in LE and normalize patellar tracking to allow for proper functional mobility as indicated by patients Functional Deficits. [] Progressing: [] Met: [] Not Met: [] Adjusted  4. Patient will return to stair climbing and walking on unlevel terrain functional activities without increased symptoms or restriction. [] Progressing: [] Met: [] Not Met: [] Adjusted  5. Pt. To be able to return to maintenance ex. Program for LE including squats without limitation.(patient specific functional goal)    [] Progressing: [] Met: [] Not Met: [] Adjusted       Progression Towards Functional goals:  [] Patient is progressing as expected towards functional goals listed. [] Progression is slowed due to complexities listed. [] Progression has been slowed due to co-morbidities. [x] Plan just implemented, too soon to assess goals progression  [] Other:         Overall Progression Towards Functional goals/ Treatment Progress Update:  [] Patient is progressing as expected towards functional goals listed. [] Progression is slowed due to complexities/Impairments listed. [] Progression has been slowed due to co-morbidities.   [x] Plan just implemented, too soon to assess goals progression <30days   [] Goals require adjustment due to lack of progress  [] Patient is not progressing as expected and requires additional follow up with physician  [] Other    Prognosis for POC: [x] Good [] Fair  [] Poor      Patient requires continued skilled intervention: [x] Yes  [] No    Treatment/Activity Tolerance:  [x] Patient able to complete treatment  [] Patient limited by fatigue  [] Patient limited by pain    [] Patient limited by other medical complications  [] Other:     ASSESSMENT: short on time due to pt. Being late, spent time working on hip mobility and ant hip flexibility as well as glut firing for sequencing to aid in correct mobility and firing pattern during activity like walking. Pt. Without pain, still challenged by sequencing and fatigue noted more on R than L. Pt. Will be OOT for a few days and will likely return Next week for PT visit x1 and depending on how things are going with the knee and with walking, he feels like he might be ready to try the HEP on his own for a while. Today, pelvic alignment correct following hip mobs and stretching. Return to Play: (if applicable)   []  Stage 1: Intro to Strength   []  Stage 2: Return to Run and Strength   []  Stage 3: Return to Jump and Strength   []  Stage 4: Dynamic Strength and Agility   []  Stage 5: Sport Specific Training     []  Ready to Return to Play, Meets All Above Stages   []  Not Ready for Return to Sports   Comments:                               PLAN: Add tib/fem and pat mobs to inc knee flexion mobility. [x] Continue per plan of care [] Alter current plan (see comments above)  [] Plan of care initiated [] Hold pending MD visit [] Discharge      Electronically signed by:  Alen Joseph, PT, MSPT, OMT-C [id number]        Note: If patient does not return for scheduled/ recommended follow up visits, this note will serve as a discharge from care along with most recent update on progress.

## 2022-03-22 ENCOUNTER — APPOINTMENT (OUTPATIENT)
Dept: PHYSICAL THERAPY | Age: 57
End: 2022-03-22
Payer: COMMERCIAL

## 2022-03-25 ENCOUNTER — HOSPITAL ENCOUNTER (OUTPATIENT)
Dept: PHYSICAL THERAPY | Age: 57
Setting detail: THERAPIES SERIES
Discharge: HOME | End: 2022-03-25
Payer: COMMERCIAL

## 2022-03-25 PROCEDURE — 97110 THERAPEUTIC EXERCISES: CPT

## 2022-03-25 PROCEDURE — 97112 NEUROMUSCULAR REEDUCATION: CPT

## 2022-03-25 NOTE — FLOWSHEET NOTE
level crest and PSIS level in standing , - FBT in standing and sitting, (-) sup-->sit   RESTRICTIONS/PRECAUTIONS: OA    Exercises/Interventions:     Access Code: [email or web address]  URL: [email or web address]  Date: 03/17/2022  Prepared by: Bo Garwood    Exercises  Supine Hamstring Stretch with Strap - 2 x daily - 7 x weekly - 1 sets - 4 reps - 30 hold  Supine ITB Stretch with Strap - 2 x daily - 7 x weekly - 1 sets - 4 reps - 30 hold  Supine Quadriceps Stretch with Strap on Table - 2 x daily - 7 x weekly - 1 sets - 4 reps - 30 hold  Supine Figure 4 Piriformis Stretch - 2 x daily - 7 x weekly - 3 sets - 4 reps - 30 hold  Supine Piriformis Stretch with Leg Straight - 2 x daily - 7 x weekly - 1 sets - 4 reps - 30 hold  Supine Straight Leg Raises - 2 x daily - 7 x weekly - 3 sets - 10 reps  Sidelying Hip Abduction - 2 x daily - 7 x weekly - 3 sets - 10 reps  Prone Hip Extension with Bent Knee - 2 x daily - 7 x weekly - 3 sets - 10 reps  Half Kneeling Hip Flexor Stretch with Sidebend - 2 x daily - 7 x weekly - 2 reps - 30 hold  Prone Gluteal Sets - 3 x weekly - 5 reps - 5 hold  Prone Terminal Knee Extension - 3 x weekly - 3 reps - 3 hold  Prone Hip Extension - 3 x weekly - 5 reps - 3 hold  Bird Dog - 1 x daily - 3 x weekly - 5 reps - 10 hold  Quadruped Yoga Block Lift Off - 1 x daily - 3 x weekly - 15 reps  Standing Hip Abduction on Slider - 1 x daily - 3 x weekly - 15 reps      Therapeutic Ex (61729) Sets/sec Reps Notes/CUES   Supine Hamstring stretch Verb review only today   Supine lat hamstring stretch \" \" \"   Supine ITB stretch    Supine hip ER \" \" \"   Supine KTOS    Supine modified Judithann Northern' with strap Verb reivew only today   1/2 kneel hip flexor/psoas S (lunge + lean) Cue for slight PPT to stabilize pelvis   True-stretch:   HS, lat HS, lat hip, quad, psoas reach and QL :30 ea  R, L ex x1 Post tx   Supine SLR F, abd Contin for HEP   Prone hip ext with knee flexed  \" \" \"   Glider abd, ext diag Fingertip UE supp, cue for upright trunk   Quadruped hip ext  Quadruped bird dog Mirror for feedback for neutral spine pos   LP  LP ecc  SLP  Calf raises 2  :5   80#  2 x10  x10 R, L  x10 R, L  10 B 100#  100#  80#  100#   HSC  LAQ ecc 2  :5 10  x10 R, L 50#  20#               Manual Intervention (37913)      Re-assess pelvis 2'  No correction needed   Prone fig 4 hip PA R/L     Prone quad/HF stretch R/L     Mobs for pat, knee E/F NV     LLP gr IV-V hip  1'x2 R, L   LLP gr V sacral  Sup>sit = before and after  1' R, L  MFR leg pull S to ITB<>adductors                  NMR re-education (75362)   CUES NEEDED   Prone glute retraining:B glute  R, L glute iso  + TKE  + hip ext     Hip hinge from hi-low table  With mini squat x10  x10  Mirror to help maintain lumbar lordosis   RDL-small range, reach to knee level 2x5 R, L  mirror   Quadruped multif activ hip hike  + on 1/2 FR to inc ROM   Mirror for feedback for neutral spine pos         HL QL stretch          SLS + Airex with 3D LE reach x10 rounds R, L stance  Cue for level pelvis   LSD 6\" x15 R, L                 Therapeutic Activity (54841)                                                Therapeutic Exercise and NMR EXR  [x] (54651) Provided verbal/tactile cueing for activities related to strengthening, flexibility, endurance, ROM for improvements in LE, proximal hip, and core control with self care, mobility, lifting, ambulation.  [] (86047) Provided verbal/tactile cueing for activities related to improving balance, coordination, kinesthetic sense, posture, motor skill, proprioception  to assist with LE, proximal hip, and core control in self care, mobility, lifting, ambulation and eccentric single leg control.      NMR and Therapeutic Activities:    [x] (41597 or 71834) Provided verbal/tactile cueing for activities related to improving balance, coordination, kinesthetic sense, posture, motor skill, proprioception and motor activation to allow for proper function of core, proximal hip and LE with self care and ADLs  [] (71459) Gait Re-education- Provided training and instruction to the patient for proper LE, core and proximal hip recruitment and positioning and eccentric body weight control with ambulation re-education including up and down stairs     Home Exercise Program:    [x] (04805) Reviewed/Progressed HEP activities related to strengthening, flexibility, endurance, ROM of core, proximal hip and LE for functional self-care, mobility, lifting and ambulation/stair navigation   [] (75487)Reviewed/Progressed HEP activities related to improving balance, coordination, kinesthetic sense, posture, motor skill, proprioception of core, proximal hip and LE for self care, mobility, lifting, and ambulation/stair navigation      Manual Treatments:  PROM / STM / Oscillations-Mobs:  G-I, II, III, IV (PA's, Inf., Post.)  [x] (81145) Provided manual therapy to mobilize LE, proximal hip and/or LS spine soft tissue/joints for the purpose of modulating pain, promoting relaxation,  increasing ROM, reducing/eliminating soft tissue swelling/inflammation/restriction, improving soft tissue extensibility and allowing for proper ROM for normal function with self care, mobility, lifting and ambulation. Modalities:     [] GAME READY (VASO)- for significant edema, swelling, pain control.      Charges  Timed Code Treatment Minutes: 43   Total Treatment Minutes: 43     Medicare total (add KX at $2000)         BWC time in/ time out:    TE time in /out    Manual time in/out    Neuro re ed time in/out    Untimed minutes        [] EVAL (LOW) 67925   [] EVAL (MOD) 07245  [] EVAL (HIGH) 68175   [] RE-EVAL     [x] SY(85117) x  2   [] IONTO  [x] NMR (86819) x1     [] VASO  [] Manual (26791) x      [] Other:  [] TA x      [] Mech Traction (60194)  [] ES(attended) (53031)      [] ES (un) (79203):       GOALS:   Patient stated goal: play golf w/o pain, work out/be active w/o pain  [] Progressing: [] Met: [] Not Met: [] Adjusted    Therapist goals for Patient:   Short Term Goals: To be achieved in: 2 weeks  1. Independent in HEP and progression per patient tolerance, in order to prevent re-injury. [] Progressing: [] Met: [] Not Met: [] Adjusted  2. Patient will have a decrease in pain to facilitate improvement in movement, function, and ADLs as indicated by Functional Deficits. [] Progressing: [] Met: [] Not Met: [] Adjusted    Long Term Goals: To be achieved in: 6 weeks  1. Disability index score of 5% or less for the LEFS to assist with reaching prior level of function. [] Progressing: [] Met: [] Not Met: [] Adjusted  2. Patient will demonstrate increased AROM to L knee 0-130 to allow for proper joint functioning as indicated by patients Functional Deficits. [] Progressing: [] Met: [] Not Met: [] Adjusted  3. Patient will demonstrate an increase in Strength to good proximal hip strength and control, within 5lb HHD in LE and normalize patellar tracking to allow for proper functional mobility as indicated by patients Functional Deficits. [] Progressing: [] Met: [] Not Met: [] Adjusted  4. Patient will return to stair climbing and walking on unlevel terrain functional activities without increased symptoms or restriction. [] Progressing: [] Met: [] Not Met: [] Adjusted  5. Pt. To be able to return to maintenance ex. Program for LE including squats without limitation.(patient specific functional goal)    [] Progressing: [] Met: [] Not Met: [] Adjusted       Progression Towards Functional goals:  [x] Patient is progressing as expected towards functional goals listed. [] Progression is slowed due to complexities listed. [] Progression has been slowed due to co-morbidities. [] Plan just implemented, too soon to assess goals progression  [] Other:         Overall Progression Towards Functional goals/ Treatment Progress Update:  [x] Patient is progressing as expected towards functional goals listed.     [] Progression is slowed due to complexities/Impairments listed. [] Progression has been slowed due to co-morbidities. [] Plan just implemented, too soon to assess goals progression <30days   [] Goals require adjustment due to lack of progress  [] Patient is not progressing as expected and requires additional follow up with physician  [] Other    Prognosis for POC: [x] Good [] Fair  [] Poor      Patient requires continued skilled intervention: [x] Yes  [] No    Treatment/Activity Tolerance:  [x] Patient able to complete treatment  [] Patient limited by fatigue  [] Patient limited by pain    [] Patient limited by other medical complications  [] Other:     ASSESSMENT: Pt continues to have no discomfort in knee limiting activity, but he is not back to full intensity of his personal workout goals. Added inc'd quad/HS strength and functional mvmts to work on glute activation in [address] He needs cueing for varus/valgus control, and cue for great toe activ to reduce supination. He did not have pain in L knee, but feels less stable in L stance. He feels like he might be ready to try the HEP on his own for a while but will plan to return for HEP update/progression as needed next 30 days. He will contact MD to discuss weaning from NSAID. Return to Play: (if applicable)   []  Stage 1: Intro to Strength   []  Stage 2: Return to Run and Strength   []  Stage 3: Return to Jump and Strength   []  Stage 4: Dynamic Strength and Agility   []  Stage 5: Sport Specific Training     []  Ready to Return to Play, Meets All Above Stages   []  Not Ready for Return to Sports   Comments:                               PLAN: Add tib/fem and pat mobs to inc knee flexion mobility as needed (pt felt good today, didn't feel need to work on joint.) F/u next 30 days prn.   [x] Continue per plan of care [] Alter current plan (see comments above)  [] Plan of care initiated [] Hold pending MD visit [] Discharge      Electronically signed by:  Leanne Noel PT, DPT        Note: If patient does not return for scheduled/ recommended follow up visits, this note will serve as a discharge from care along with most recent update on progress.

## 2022-03-31 ENCOUNTER — HOSPITAL ENCOUNTER (OUTPATIENT)
Dept: PHYSICAL THERAPY | Age: 57
Setting detail: THERAPIES SERIES
Discharge: HOME | End: 2022-03-31
Payer: COMMERCIAL

## 2022-03-31 NOTE — FLOWSHEET NOTE
[facility], [address], [address] [place] [address]        Physical Therapy  Cancellation/No-show Note  Patient Name:  Francis Lopez  :  1965   Date:  3/31/2022  Cancelled visits to date: 2  No-shows to date: 0    For today's appointment patient:  [x]  Cancelled  []  Rescheduled appointment  []  No-show     Reason given by patient:  []  Patient ill  [x]  Conflicting appointment  []  No transportation    []  Conflict with work  []  No reason given  []  Other:     Comments: pt. Has a meeting for work that he cannot miss.      Electronically signed by:  Huseyin Werner, PT, [address] [facility], [facility]

## 2023-01-24 ENCOUNTER — TELEPHONE (OUTPATIENT)
Dept: ORTHOPEDIC SURGERY | Age: 58
End: 2023-01-24

## 2023-01-24 ENCOUNTER — OFFICE VISIT (OUTPATIENT)
Dept: ORTHOPEDIC SURGERY | Age: 58
End: 2023-01-24

## 2023-01-24 VITALS — BODY MASS INDEX: 27.92 KG/M2 | HEIGHT: 70 IN | WEIGHT: 195 LBS

## 2023-01-24 DIAGNOSIS — M17.11 PRIMARY OSTEOARTHRITIS OF RIGHT KNEE: Primary | ICD-10-CM

## 2023-01-24 DIAGNOSIS — M25.561 ACUTE PAIN OF RIGHT KNEE: ICD-10-CM

## 2023-01-24 RX ORDER — MELOXICAM 15 MG/1
15 TABLET ORAL DAILY PRN
Qty: 30 TABLET | Refills: 0 | Status: SHIPPED | OUTPATIENT
Start: 2023-01-24

## 2023-01-24 RX ORDER — EPINEPHRINE 0.3 MG/.3ML
0.3 INJECTION SUBCUTANEOUS SEE ADMIN INSTRUCTIONS
COMMUNITY
Start: 2021-09-14

## 2023-01-24 SDOH — HEALTH STABILITY: PHYSICAL HEALTH: ON AVERAGE, HOW MANY MINUTES DO YOU ENGAGE IN EXERCISE AT THIS LEVEL?: 30 MIN

## 2023-01-24 SDOH — HEALTH STABILITY: PHYSICAL HEALTH: ON AVERAGE, HOW MANY DAYS PER WEEK DO YOU ENGAGE IN MODERATE TO STRENUOUS EXERCISE (LIKE A BRISK WALK)?: 3 DAYS

## 2023-01-24 ASSESSMENT — SOCIAL DETERMINANTS OF HEALTH (SDOH)
WITHIN THE LAST YEAR, HAVE YOU BEEN HUMILIATED OR EMOTIONALLY ABUSED IN OTHER WAYS BY YOUR PARTNER OR EX-PARTNER?: NO
WITHIN THE LAST YEAR, HAVE YOU BEEN AFRAID OF YOUR PARTNER OR EX-PARTNER?: NO
WITHIN THE LAST YEAR, HAVE TO BEEN RAPED OR FORCED TO HAVE ANY KIND OF SEXUAL ACTIVITY BY YOUR PARTNER OR EX-PARTNER?: NO
WITHIN THE LAST YEAR, HAVE YOU BEEN KICKED, HIT, SLAPPED, OR OTHERWISE PHYSICALLY HURT BY YOUR PARTNER OR EX-PARTNER?: NO

## 2023-01-24 NOTE — PROGRESS NOTES
ORTHOPAEDIC SURGERY H&P / CONSULTATION NOTE    Chief complaint:   Chief Complaint   Patient presents with    Knee Pain     R KNEE PN      History of present illness: The patient is a 61 y.o. male with subjective symptoms of right knee pain. The chief complaint is located at anterior aspect right knee and occasional lateral based. Duration of symptoms has been for last 3 to 4 weeks. The severity of symptoms is rated at 1/10 pain but can be as high as 7/10 pain on intake form. Patient states 2 to 3 months ago he had fallen down some stairs and ultimately hyperflexed his right knee. He states that it ultimately had started to feel better than that. He had gone skiing 3 weeks ago and started to have an increase in anterior related knee pain as well as lateral based knee pain. He also states that it is felt similar to what his left knee had felt like. He did well with his left knee having been seen by me last year and had tried an anti-inflammatory as well as formal physical therapy which dramatically improved his symptoms. He denies mechanical twisting knee pain. He reports an previous bilateral knee arthroscopic procedures in the past.  This was to treat meniscus injuries. He denies significant swelling. He states dull throbbing aching pain. Has done Motrin sparingly. Denies any formal therapy for the right knee. Denies injections recently for the right knee    The patient has tried the below listed items prior to today's consultation for above listed chief complaint.     +   Over-the-counter anti-inflammatories/prescription medication anti-inflammatory.      -   physical therapy / guided home exercise program -     -   Previous corticosteroid injections    Past medical history:    Past Medical History:   Diagnosis Date    Arthritis     knees    BBB (bundle branch block)     Wears glasses         Past surgical history:    Past Surgical History:   Procedure Laterality Date    KNEE ARTHROSCOPY Right 2007 VASECTOMY      WISDOM TOOTH EXTRACTION          Allergies:  No Known Allergies      Medications:   Current Outpatient Medications:     EPINEPHrine (EPIPEN) 0.3 MG/0.3ML SOAJ injection, Inject 0.3 mg into the muscle See Admin Instructions, Disp: , Rfl:     meloxicam (MOBIC) 15 MG tablet, Take 1 tablet by mouth daily as needed for Pain, Disp: 30 tablet, Rfl: 0    ibuprofen (ADVIL;MOTRIN) 200 MG CAPS, Take 1 capsule by mouth, Disp: , Rfl:     meloxicam (MOBIC) 15 MG tablet, Take 1 tablet by mouth daily as needed for Pain (Patient not taking: Reported on 1/24/2023), Disp: 30 tablet, Rfl: 0    meloxicam (MOBIC) 15 MG tablet, Take 1 tablet by mouth daily (Patient not taking: Reported on 1/24/2023), Disp: 30 tablet, Rfl: 0     Social history: Denies IV drug use. Social History     Socioeconomic History    Marital status:      Spouse name: Not on file    Number of children: Not on file    Years of education: Not on file    Highest education level: Not on file   Occupational History    Not on file   Tobacco Use    Smoking status: Never    Smokeless tobacco: Never   Substance and Sexual Activity    Alcohol use: Yes     Comment: daily    Drug use: No    Sexual activity: Not on file   Other Topics Concern    Not on file   Social History Narrative    Not on file     Social Determinants of Health     Financial Resource Strain: Not on file   Food Insecurity: Not on file   Transportation Needs: Not on file   Physical Activity: Insufficiently Active    Days of Exercise per Week: 3 days    Minutes of Exercise per Session: 30 min   Stress: Not on file   Social Connections: Not on file   Intimate Partner Violence: Not At Risk    Fear of Current or Ex-Partner: No    Emotionally Abused: No    Physically Abused: No    Sexually Abused: No   Housing Stability: Not on file     Tobacco use.     Social History     Tobacco Use   Smoking Status Never   Smokeless Tobacco Never     Employment: Noncontributory    Workers compensation claim: Noncontributory    Review of systems: Patient denies any fevers chills chest pain shortness of breath nausea vomiting significant weight loss any change in voiding or bowel movements. Patient denies any significant numbness or tingling at baseline as it relates to this presenting symptom/chief complaint. The patient denies any significant problems with skin or any significant allergies. Physical examination:  Body mass index is 27.98 kg/m². AAOx3, NCAT  EOMI  MMM  RR  Unlabored breathing, no wheezing  Skin intact BUE and BLE, warm and moist  Bilateral lower extremity examination specific to subjective symptoms  Exam Right Lower Extremity  Negative effusion, 2/122/0 active ROM (E/F/Lag), same P assive ROM (E/F/Lag), trace albeit with firm endpoint anterior Drawer, 1A Lachman,   negative posterior Drawer,  Stable varus/valgus at 0 and 30?,    none TTP Joint Line, negative Gabriela, trace Emmanuel's and lateral patellar facet tenderness. Skin intact throughout  5/5 IP Q H TA G EHL  SILT DP SP LP MP S S  +2 DP pulse    Diagnostic imaging:  MY READ:  4 view right knee 1/24/2023: Negative fracture. Patellofemoral and lateral compartment arthrosis moderate greater than medial compartment    Pertinent lab work: None     Diagnosis Orders   1. Primary osteoarthritis of right knee  meloxicam (MOBIC) 15 MG tablet    Ambulatory referral to Physical Therapy      2. Acute pain of right knee  XR KNEE RIGHT (MIN 4 VIEWS)          Assessment and plan: 61 y.o. male with current subjective symptoms and physical exam findings with diagnostic imaging correlating to right knee osteoarthritis. -Time of 16 minutes was spent coordinating and discussing the clinical findings, reviewing diagnostic imaging as indicated, coordinating care with prior notes review and current clinical encounter documentation as it pertains to the patient's presenting subjective symptoms and diagnoses.   -I reviewed with the patient the imaging findings as well as clinical exam and  how it correlates to subjective symptoms.  -I had a pleasant discussion with the patient today. I reviewed with him that currently his clinical examination reveals right knee osteoarthritis with acute on chronic exacerbation. I reviewed with him consideration for conservative care treatment options. Currently he wishes to pursue these. His exam is benign for any gross ligament pathology however I do suspect potentially an old ACL related injury given the very slight side-to-side difference however there is an endpoint still. No significant subjective symptoms of mechanical twisting knee pain and his exam is benign for any significant meniscal pathology  -Mobic 15 mg p.o. daily as needed pain and OTC Tylenol per bottle as needed discomfort  -Formal physical therapy has been prescribed given the patient had excellent symptomatology relief with regard to his left knee last year. This would be for NADINE knee reconditioning and strengthening and to include pain modalities.  -Should the patient still have pain in 4 to 6 weeks time, he will contact the office for consideration of a right knee intra-articular corticosteroid injection for treatment of knee osteoarthritis.  -All questions answered to the patient's satisfaction and the patient expressed understanding and agreement with the above listed treatment plan  -Follow up in per the above  -Thank you for the clinical consultation and allowing me to participate in the patient's care. Electronically signed by Nickie Fritz MD on 1/24/23 at 2:27 PM EST         Nickie Fritz MD       Orthopaedic Surgery-Sports Medicine        Disclaimer: This note was dictated with voice recognition software. Though review and correction are routinely performed, please contact the office/medical records for any errors requiring correction.

## 2023-01-27 ENCOUNTER — HOSPITAL ENCOUNTER (OUTPATIENT)
Dept: PHYSICAL THERAPY | Age: 58
Setting detail: THERAPIES SERIES
Discharge: HOME | End: 2023-01-27
Payer: COMMERCIAL

## 2023-01-27 PROCEDURE — 97161 PT EVAL LOW COMPLEX 20 MIN: CPT | Performed by: PHYSICAL THERAPIST

## 2023-01-27 PROCEDURE — 97110 THERAPEUTIC EXERCISES: CPT | Performed by: PHYSICAL THERAPIST

## 2023-01-27 NOTE — PLAN OF CARE
The [facility] and [facility], [address] [facility], [address]    Dear Dr Shlomo Horton  ,    We had the pleasure of evaluating the following patient for physical therapy services at [address] [place] [address]. A summary of our findings can be found in the initial assessment below. This includes our plan of care. If you have any questions or concerns regarding these findings, please do not hesitate to contact me at the office phone number checked above. Thank you for the referral.       Physician Signature:_______________________________Date:__________________  By signing above (or electronic signature), therapists plan is approved by physician      Patient: Celeste Morales   : 1965   MRN: [id number]  Referring Physician:        Evaluation Date: 2023      Medical Diagnosis Information:  Diagnosis: M17.11 (ICD-10-CM) - Primary osteoarthritis of right knee             Treatment Diagnosis: Right knee pain M25.561                                Insurance information:        Precautions/ Contra-indications:   Latex Allergy:  [x]NO      []YES    C-SSRS Triggered by Intake questionnaire (Past 2 wk assessment):   [x] No, Questionnaire did not trigger screening.   [] Yes, Patient intake triggered further evaluation (seeing psychologist)      [] C-SSRS Screening completed  [] PCP notified via Plan of Care  [] Emergency services notified     Preferred Language for Healthcare:   [x]English       []other:    SUBJECTIVE: Patient stated complaint: Pt reports he irritated his R knee shoveling snow (2 hours). He noticed pain after and into Monday. He has had arthroscopic surgery on both knees previously, but no significant pain that could not be handled with motrin. Also reports having fallen down the stairs previously in a hyperflexed position that helped his knee (prior to this aggravation).   Reports some lateral thigh numbness for 4-5 years. Relevant Medical History:arthroscopic surgery    Height Weight  Easing factors: motrin  Provocative factors: transfers, change of position     Type: []Constant   [x]Intermittent  []Radiating []Localized []other:     Paresthesias: lateral thigh- constant  Occupation/School:[employer]    Living Status/Prior Level of Function: Independent with ADLs and IADLs,      OBJECTIVE:      Initial Initial Current   VAS 2-6/10     LEFS 30%     ROM LEFT RIGHT    HIP Flex  90 with lateral deviation    HIP Abd      HIP add      HIP Ext      HIP IR 10 -10    HIP ER 65 75    Knee ext -3 -6    Knee Flex 124 126    Ankle PF      Ankle DF      Ankle In      Ankle Ev      Strength  LEFT RIGHT    HIP Flexors 5 5    HIP Abductors 5 4+    HIP add      HIP Ext      Hip ER      Hip IR      Knee EXT (quad) 5 5    Knee Flex (HS) 5 5    Ankle DF 5 5    Ankle PF      Ankle Inv      Ankle EV            Circumference  MP  SP      LE Dermatomes      LE myotomes          Flexibility L R Comment   Hamstring WNL WNL    Gastroc   restricted     ITB       Quad      Hip flexor  restricted     Glut KTC       KTOS  restricted     Piriformis ig 4  restricted     Prasanna             Reflexes/Sensation:    []Dermatomes/Myotomes intact    []Reflexes equal and normal bilaterally   []Other:    Joint mobility: decreased medial patellar glide, decreased superior patellar glide   []Normal    [x]Hypo   []Hyper    Palpation: tight ITB, decreased and latent medial VMO firing- atrophy noted    Functional Mobility/Transfers: LLE lunge increased pain/numbness in R quad    Posture:  WNL    Bandages/Dressings/Incisions: NA    Gait: (include devices/WB status) decreased heel strike; knee flexion noted during stance    Orthopedic Special Tests: dnd  HIP KNEE ANKLE   Test Result Test Result Test Result   Scour  Lachman's  Anterior Drawer    Log Roll  Angela's  Talar Tilt    HUNTER  Valgus   Squeeze Test    FADIR  Varus Heydi's  Posterior Drawer        Patellar Apprehension                               [x] Patient history, allergies, meds reviewed. Medical chart reviewed. See intake form. Review Of Systems (ROS):  [x]Performed Review of systems (Integumentary, CardioPulmonary, Neurological) by intake and observation. Intake form has been scanned into medical record. Patient has been instructed to contact their primary care physician regarding ROS issues if not already being addressed at this time. Co-morbidities/Complexities (which will affect course of rehabilitation):   [x]None           Arthritic conditions   []Rheumatoid arthritis (M05.9)  []Osteoarthritis (M19.91)   Cardiovascular conditions   []Hypertension (I10)  []Hyperlipidemia (E78.5)  []Angina pectoris (I20)  []Atherosclerosis (I70)   Musculoskeletal conditions   []Disc pathology   []Congenital spine pathologies   []Prior surgical intervention  []Osteoporosis (M81.8)  []Osteopenia (M85.8)   Endocrine conditions   []Hypothyroid (E03.9)  []Hyperthyroid Gastrointestinal conditions   []Constipation (R98.22)   Metabolic conditions   []Morbid obesity (E66.01)  []Diabetes type 1(E10.65) or 2 (E11.65)   []Neuropathy (G60.9)     Pulmonary conditions   []Asthma (J45)  []Coughing   []COPD (J44.9)   Psychological Disorders  []Anxiety (F41.9)  []Depression (F32.9)   []Other:   []Other:          Barriers to/and or personal factors that will affect rehab potential:              []Age  []Sex              []Motivation/Lack of Motivation                        []Co-Morbidities              []Cognitive Function, education/learning barriers              []Environmental, home barriers              []profession/work barriers  []past PT/medical experience  []other:  Justification: pt will do well      Falls Risk Assessment (30 days): NA  [] Falls Risk assessed and no intervention required.   [] Falls Risk assessed and Patient requires intervention due to being higher risk   TUG score (>12s at risk):     [] Falls education provided. G-Codes:        ASSESSMENT:  Pt is a 61year old male with R knee pain after shoveling his driveway. He presents with biomechanical dysfunction of hip and knee contributing to knee pain and gait dysfunction. He will benefit from PT to address these issues and return to PLOF. Functional Impairments:     [x]Noted lumbar/proximal hip/LE joint hypomobility   [x]Decreased LE functional ROM   [x]Decreased core/proximal hip strength and neuromuscular control   [x]Decreased LE functional strength   [x]Reduced balance/proprioceptive control   []other:      Functional Activity Limitations (from functional questionnaire and intake)   []Reduced ability to tolerate prolonged functional positions   [x]Reduced ability or difficulty with changes of positions or transfers between positions   []Reduced ability to maintain good posture and demonstrate good body mechanics with sitting, bending, and lifting   []Reduced ability to sleep   [] Reduced ability or tolerance with driving and/or computer work   []Reduced ability to perform lifting, carrying tasks   [x]Reduced ability to squat   []Reduced ability to forward bend   [x]Reduced ability to ambulate prolonged functional periods/distances/surfaces   [x]Reduced ability to ascend/descend stairs   [x]Reduced ability to run, hop, cut or jump   []other:    Participation Restrictions   [x]Reduced participation in self care activities   [x]Reduced participation in home management activities   [x]Reduced participation in work activities   [x]Reduced participation in social activities. [x]Reduced participation in sport/recreation activities. Classification :    []Signs/symptoms consistent with post-surgical status including decreased ROM, strength and function.    [x]Signs/symptoms consistent with joint sprain/strain   [x]Signs/symptoms consistent with patella-femoral syndrome   []Signs/symptoms consistent with knee OA/hip OA   []Signs/symptoms consistent with internal derangement of knee/Hip   [x]Signs/symptoms consistent with functional hip weakness/NMR control      []Signs/symptoms consistent with tendinitis/tendinosis    []signs/symptoms consistent with pathology which may benefit from Dry needling      []other:      Prognosis/Rehab Potential:      []Excellent   [x]Good    []Fair   []Poor    Tolerance of evaluation/treatment:    []Excellent   [x]Good    []Fair   []Poor    Physical Therapy Evaluation Complexity Justification  [x] A history of present problem with:  [x] no personal factors and/or comorbidities that impact the plan of care;  []1-2 personal factors and/or comorbidities that impact the plan of care  []3 personal factors and/or comorbidities that impact the plan of care  [x] An examination of body systems using standardized tests and measures addressing any of the following: body structures and functions (impairments), activity limitations, and/or participation restrictions;:  [x] a total of 1-2 or more elements   [] a total of 3 or more elements   [] a total of 4 or more elements   [x] A clinical presentation with:  [x] stable and/or uncomplicated characteristics   [] evolving clinical presentation with changing characteristics  [] unstable and unpredictable characteristics;   [x] Clinical decision making of [x] low, [] moderate, [] high complexity using standardized patient assessment instrument and/or measurable assessment of functional outcome.     [x] EVAL (LOW) 85176 (typically 20 minutes face-to-face)  [] EVAL (MOD) 87580 (typically 30 minutes face-to-face)  [] EVAL (HIGH) 28403 (typically 45 minutes face-to-face)  [] RE-EVAL     PLAN  Frequency/Duration:  1 days per week for 6 Weeks:  Interventions:  [x]  Therapeutic exercise including: strength training, ROM, for Lower extremity and core   [x]  NMR activation and proprioception for LE, Glutes and Core   [x]  Manual therapy as indicated for LE, Hip and spine to include: Dry Needling/IASTM, STM, PROM, Gr I-IV mobilizations, manipulation. [x] Modalities as needed that may include: thermal agents, E-stim, Biofeedback, US, iontophoresis as indicated  [x] Patient education on joint protection, postural re-education, activity modification, progression of HEP. HEP instruction: Reviewed HEP and pt given handout. GOALS:  Patient stated goal: return to working out; decrease pain  [] Progressing: [] Met: [x] Not Met: [] Adjusted    Therapist goals for Patient:   Short Term Goals: To be achieved in: 2 weeks  1. Independent in HEP and progression per patient tolerance, in order to prevent re-injury. [] Progressing: [] Met: [x] Not Met: [] Adjusted  2. Patient will have a decrease in pain to facilitate improvement in movement, function, and ADLs as indicated by Functional Deficits. [] Progressing: [] Met: [x] Not Met: [] Adjusted      Long Term Goals: To be achieved in: 6 weeks  1. Disability index score of 70/80 or more for LEFS to assist with reaching prior level of function. [] Progressing: [] Met: [x] Not Met: [] Adjusted  2. Patient will demonstrate increased AROM within 5 degrees of symmetrical and pain free to allow for 0-128 as indicated by patients Functional Deficits. [] Progressing: [] Met: [x] Not Met: [] Adjusted  3. Patient will demonstrate an increase in Strength to >/=5/5 MMT to allow for stair navigation as indicated by patients Functional Deficits. [] Progressing: [] Met: [x] Not Met: [] Adjusted  4. Patient will return to ambulation functional activities without increased symptoms or restriction. [] Progressing: [] Met: [x] Not Met: [] Adjusted  5. Pt will return normal exercise regimen without symptoms.   [] Progressing: [] Met: [x] Not Met: [] Adjusted     Electronically signed by:  Don Barksdale, PT, DPT

## 2023-01-27 NOTE — FLOWSHEET NOTE
The [address], [address], [address] [place], [place]      Physical Therapy Daily Treatment Note  Date:  2023    Patient Name:  Marvin Chase    :  1965  MRN: [id number]  Restrictions/Precautions:    Physician Information:   Michel Fuentes  Medical/Treatment Diagnosis Information:  Diagnosis: M17.11 (ICD-10-CM) - Primary osteoarthritis of right knee  Treatment Diagnosis: Right knee pain M25.561  [] Conservative / [] Surgical - DOS:    Insurance/Certification information:     Plan of care signed: [] YES  [] NO  Number of Comorbidities:  [x]0     []1-2    []3+  Date of Patient follow up with Physician:       Is this a Progress Report:     []  Yes  [x]  No        If Yes:  Date Range for reporting period:  Beginning 2023  Ending    Progress report will be due (10 Rx or 30 days whichever is less): 3/96/5610       Recertification will be due (POC Duration  / 90 days whichever is less): 3/10/2023        Visit # Insurance Allowable   1 ?    Telehealth        Latex Allergy:  [x]NO      []YES  Preferred Language for Healthcare:   [x]English       []other:    SUBJECTIVE:  See eval    OBJECTIVE:    Initial Initial Current   VAS 2-6/10       LEFS 30%       ROM LEFT RIGHT     HIP Flex   90 with lateral deviation     HIP Abd         HIP add         HIP Ext         HIP IR 10 -10     HIP ER 65 75     Knee ext -3 -6     Knee Flex 124 126     Ankle PF         Ankle DF         Ankle In         Ankle Ev         Strength  LEFT RIGHT     HIP Flexors 5 5     HIP Abductors 5 4+     HIP add         HIP Ext         Hip ER         Hip IR         Knee EXT (quad) 5 5     Knee Flex (HS) 5 5     Ankle DF 5 5           Flexibility L R Comment   Hamstring WNL WNL     Gastroc    restricted     ITB    restricted     Quad         Hip flexor   restricted     Glut KTC         KTOS   restricted     Piriformis ig 4   restricted       Observation:   Test measurements: RESTRICTIONS/PRECAUTIONS: B knee arthroscopies    Exercises/Interventions:   Access Code: [facility]  URL: [email or web address]  Date: 01/27/2023  Prepared by: Bhavna Dash    Exercises  Long Sitting Calf Stretch with Strap - 2-3 x daily - 7 x weekly - 2-3 sets - 4 reps - 30 hold  Supine Quadratus Lumborum Stretch - 2-3 x daily - 7 x weekly - 2-3 sets - 4 reps - 30 hold  Supine Piriformis Stretch with Foot on Ground - 2-3 x daily - 7 x weekly - 2-3 sets - 4 reps - 30 hold  Long Sitting Quad Set - 2-3 x daily - 7 x weekly - 1-2 sets - 10 reps - 10 hold  Supine Active Straight Leg Raise - 2-3 x daily - 7 x weekly - 2-3 sets - 10 reps  Sidelying Hip Abduction - 2-3 x daily - 7 x weekly - 2-3 sets - 10 reps    Therapeutic Ex Sets/reps Notes                            Pt ed : eval findings, PT progression, hip involvement 10 min                                                      Manual Intervention     Hip mobs NV    ITB STW NV                        NMR re-education                                                      Therapeutic Exercise and NMR EXR  [x] (63200) Provided verbal/tactile cueing for activities related to strengthening, flexibility, endurance, ROM for improvements in LE, proximal hip, and core control with self care, mobility, lifting, ambulation.  [] (59338) Provided verbal/tactile cueing for activities related to improving balance, coordination, kinesthetic sense, posture, motor skill, proprioception  to assist with LE, proximal hip, and core control in self care, mobility, lifting, ambulation and eccentric single leg control.      NMR and Therapeutic Activities:    [x] (52022 or 58896) Provided verbal/tactile cueing for activities related to improving balance, coordination, kinesthetic sense, posture, motor skill, proprioception and motor activation to allow for proper function of core, proximal hip and LE with self care and ADLs  [] (57947) Gait Re-education- Provided training and instruction to the patient for proper LE, core and proximal hip recruitment and positioning and eccentric body weight control with ambulation re-education including up and down stairs     Home Exercise Program:    [x] (19704) Reviewed/Progressed HEP activities related to strengthening, flexibility, endurance, ROM of core, proximal hip and LE for functional self-care, mobility, lifting and ambulation/stair navigation   [] (99137)Reviewed/Progressed HEP activities related to improving balance, coordination, kinesthetic sense, posture, motor skill, proprioception of core, proximal hip and LE for self care, mobility, lifting, and ambulation/stair navigation      Manual Treatments:  PROM / STM / Oscillations-Mobs:  G-I, II, III, IV (PA's, Inf., Post.)  [] (88311) Provided manual therapy to mobilize LE, proximal hip and/or LS spine soft tissue/joints for the purpose of modulating pain, promoting relaxation,  increasing ROM, reducing/eliminating soft tissue swelling/inflammation/restriction, improving soft tissue extensibility and allowing for proper ROM for normal function with self care, mobility, lifting and ambulation.     Modalities:        [] GR/ESU 15 min    [] GR 15 min  [] ESU     [] CP    [] MHP    [] declined     Charges:  Timed Code Treatment Minutes: 23   Total Treatment Minutes: 50     BWC time in/time out:   (and requires time in and out for each CPT code)    [x] EVAL (LOW) 35792 (typically 20 minutes face-to-face)  [] EVAL (MOD) 99863 (typically 30 minutes face-to-face)  [] EVAL (HIGH) 11457 (typically 45 minutes face-to-face)  [] RE-EVAL     [x] TE(74031) x 2   [] IONTO  [] NMR (58505) x     [] VASO  [] Manual (98013) x      [] Other:  [] TA x      [] Mech Traction (59767)  [] ES(attended) (35010)      [] ES (un) (54942):     GOALS:      Long Term Goals: To be achieved in: 6 weeks  1. Disability index score of 70/80 or more for LEFS to assist with reaching prior level of function.   [] Progressing: [] Met:  [x] Not Met: [] Adjusted  2. Patient will demonstrate increased AROM within 5 degrees of symmetrical and pain free to allow for 0-128 as indicated by patients Functional Deficits. [] Progressing: [] Met: [x] Not Met: [] Adjusted  3. Patient will demonstrate an increase in Strength to >/=5/5 MMT to allow for stair navigation as indicated by patients Functional Deficits. [] Progressing: [] Met: [x] Not Met: [] Adjusted  4. Patient will return to ambulation functional activities without increased symptoms or restriction. [] Progressing: [] Met: [x] Not Met: [] Adjusted  5. Pt will return normal exercise regimen without symptoms. [] Progressing: [] Met: [x] Not Met: [] Adjusted            Progression Towards Functional goals:  [] Patient is progressing as expected towards functional goals listed. [] Progression is slowed due to complexities listed. [] Progression has been slowed due to co-morbidities. [x] Plan just implemented, too soon to assess goals progression  [] Other:     Overall Progression Towards Functional goals/ Treatment Progress Update:  [] Patient is progressing as expected towards functional goals listed. [] Progression is slowed due to complexities/Impairments listed. [] Progression has been slowed due to co-morbidities.   [x] Plan just implemented, too soon to assess goals progression <30days   [] Goals require adjustment due to lack of progress  [] Patient is not progressing as expected and requires additional follow up with physician  [] Other    Prognosis for POC: [x] Good [] Fair  [] Poor      Patient requires continued skilled intervention: [x] Yes  [] No    Treatment/Activity Tolerance:  [x] Patient able to complete treatment  [] Patient limited by fatigue  [] Patient limited by pain    [] Patient limited by other medical complications  [] Other:         Return to Play: (if applicable)   []  Stage 1: Intro to Strength   []  Stage 2: Return to Run and Strength   []  Stage 3: Return to Jump and Strength   []  Stage 4: Dynamic Strength and Agility   []  Stage 5: Sport Specific Training     []  Ready to Return to Play, Meets All Above Stages   []  Not Ready for Return to Sports   Comments:                               PLAN: See eval  [] Continue per plan of care [] Alter current plan (see comments above)  [x] Plan of care initiated [] Hold pending MD visit [] Discharge      Electronically signed by:  Chalino Burris PT DPT    Note: If patient does not return for scheduled/ recommended follow up visits, this note will serve as a discharge from care along with most recent update on progress.

## 2023-04-03 ENCOUNTER — TELEPHONE (OUTPATIENT)
Dept: ORTHOPEDIC SURGERY | Age: 58
End: 2023-04-03

## 2023-04-03 DIAGNOSIS — M17.11 PRIMARY OSTEOARTHRITIS OF RIGHT KNEE: Primary | ICD-10-CM

## 2023-04-03 RX ORDER — MELOXICAM 15 MG/1
15 TABLET ORAL DAILY PRN
Qty: 30 TABLET | Refills: 0 | Status: SHIPPED | OUTPATIENT
Start: 2023-04-03

## 2023-04-03 NOTE — TELEPHONE ENCOUNTER
Prescription Refill     Medication Name:  MELOXICAM   [facility]   Patient Contact Number:  [phone]

## 2023-04-03 NOTE — TELEPHONE ENCOUNTER
I have spoken with the patient and have explained that per  protocol we will do a one time refill for the mobic. After this he will need to see his PCP to talk about the long term affects of using the [facility].

## 2024-07-21 ENCOUNTER — APPOINTMENT (OUTPATIENT)
Dept: GENERAL RADIOLOGY | Age: 59
End: 2024-07-21
Payer: COMMERCIAL

## 2024-07-21 ENCOUNTER — HOSPITAL ENCOUNTER (EMERGENCY)
Age: 59
Discharge: HOME | End: 2024-07-21
Attending: EMERGENCY MEDICINE
Payer: COMMERCIAL

## 2024-07-21 VITALS
OXYGEN SATURATION: 98 % | SYSTOLIC BLOOD PRESSURE: 170 MMHG | HEIGHT: 70 IN | RESPIRATION RATE: 12 BRPM | DIASTOLIC BLOOD PRESSURE: 91 MMHG | WEIGHT: 203.04 LBS | HEART RATE: 63 BPM | BODY MASS INDEX: 29.07 KG/M2 | TEMPERATURE: 97.8 F

## 2024-07-21 DIAGNOSIS — S20.212A CHEST WALL CONTUSION, LEFT, INITIAL ENCOUNTER: Primary | ICD-10-CM

## 2024-07-21 PROCEDURE — 71046 X-RAY EXAM CHEST 2 VIEWS: CPT

## 2024-07-21 PROCEDURE — 99283 EMERGENCY DEPT VISIT LOW MDM: CPT

## 2024-07-21 PROCEDURE — 6370000000 HC RX 637 (ALT 250 FOR IP): Performed by: EMERGENCY MEDICINE

## 2024-07-21 RX ORDER — LIDOCAINE 50 MG/G
1 PATCH TOPICAL DAILY
Qty: 10 PATCH | Refills: 0 | Status: SHIPPED | OUTPATIENT
Start: 2024-07-21 | End: 2024-07-31

## 2024-07-21 RX ORDER — LIDOCAINE 4 G/G
1 PATCH TOPICAL DAILY
Status: DISCONTINUED | OUTPATIENT
Start: 2024-07-21 | End: 2024-07-21 | Stop reason: HOSPADM

## 2024-07-21 NOTE — ED PROVIDER NOTES
[facility] EMERGENCY DEPARTMENT PROVIDER NOTE    Patient Identification  Pt Name: Gonzales Velazquez  MRN: [id number]  Birthdate 1965  Date of evaluation: 7/21/2024  Provider: Dong Gallardo MD  PCP: Merling, Jeffrey William, MD    HPI  Gonzales Velazquez is a 59 y.o. male who presents to the ED for evaluation after chest injury.  Patient states he fell onto piece of furniture last Sunday. Improved initially, then worsened over the last few nights. Difficult to sleep. Deep breaths hurt. Sneezing/laughing much worse. Ibuprofen and tylenol not helping much.  He denies associated shortness of breath, fever, productive cough, or chills pain is localized to his left anterior chest wall    No other complaints, modifying factors or associated symptoms.     Nursing notes reviewed.   Allergies: Patient has no known allergies.  Past medical history:   Past Medical History:   Diagnosis Date    Arthritis     knees    BBB (bundle branch block)     Wears glasses      Past surgical history:   Past Surgical History:   Procedure Laterality Date    KNEE ARTHROSCOPY Right 2007    VASECTOMY      WISDOM TOOTH EXTRACTION       Home medications:   Previous Medications    EPINEPHRINE (EPIPEN) 0.3 MG/0.3ML SOAJ INJECTION    Inject 0.3 mLs into the muscle See Admin Instructions    IBUPROFEN (ADVIL;MOTRIN) 200 MG CAPS    Take 1 capsule by mouth    MELOXICAM (MOBIC) 15 MG TABLET    Take 1 tablet by mouth daily    MELOXICAM (MOBIC) 15 MG TABLET    Take 1 tablet by mouth daily as needed for Pain    MELOXICAM (MOBIC) 15 MG TABLET    Take 1 tablet by mouth daily as needed for Pain    MELOXICAM (MOBIC) 15 MG TABLET    Take 1 tablet by mouth daily as needed for Pain     Social history:  reports that he has never smoked. He has never used smokeless tobacco. He reports current alcohol use. He reports that he does not use drugs.  Family history:    Family History   Problem Relation Age of Onset    Cancer Father        REVIEW OF SYSTEMS  8 systems

## 2024-12-11 ENCOUNTER — TELEPHONE (OUTPATIENT)
Dept: ORTHOPEDIC SURGERY | Age: 59
End: 2024-12-11

## 2024-12-11 ENCOUNTER — OFFICE VISIT (OUTPATIENT)
Dept: ORTHOPEDIC SURGERY | Age: 59
End: 2024-12-11
Payer: COMMERCIAL

## 2024-12-11 VITALS — BODY MASS INDEX: 29.06 KG/M2 | HEIGHT: 70 IN | WEIGHT: 203 LBS

## 2024-12-11 DIAGNOSIS — M25.562 CHRONIC PAIN OF LEFT KNEE: ICD-10-CM

## 2024-12-11 DIAGNOSIS — M17.12 PRIMARY OSTEOARTHRITIS OF LEFT KNEE: Primary | ICD-10-CM

## 2024-12-11 DIAGNOSIS — G89.29 CHRONIC PAIN OF LEFT KNEE: ICD-10-CM

## 2024-12-11 PROCEDURE — 99213 OFFICE O/P EST LOW 20 MIN: CPT | Performed by: ORTHOPAEDIC SURGERY

## 2024-12-12 NOTE — PROGRESS NOTES
osteophyte.  Incidental viewing of the right knee demonstrates much more severe valgus knee arthritis.    ASSESSMENT AND PLAN:  Left knee osteoarthritis    I discussed conservative treatments of osteoarthritis of the knee including but not limited to oral anti-inflammatories, activity modification, weight loss, home exercise program, topical remedies, temperature modalities, intra-articular injections both viscosupplementation and corticosteroid. We reviewed the role of physical therapy. We had a very brief discussion regarding total knee arthroplasty.  At this point, the patient would like to proceed with a course of physical therapy and utilization of Voltaren gel as a topical treatment. Formal PT referral provided. He would like to hold off on intra-articular injection at this time point.  I reviewed with him that if he would like to move forward with an injection, he can call to schedule.  Follow-up if needed.    Prasanna Scott MD

## 2024-12-13 ENCOUNTER — HOSPITAL ENCOUNTER (OUTPATIENT)
Dept: PHYSICAL THERAPY | Age: 59
Setting detail: THERAPIES SERIES
Discharge: HOME | End: 2024-12-13
Attending: ORTHOPAEDIC SURGERY
Payer: COMMERCIAL

## 2024-12-13 DIAGNOSIS — M25.562 LEFT KNEE PAIN, UNSPECIFIED CHRONICITY: Primary | ICD-10-CM

## 2024-12-13 PROCEDURE — 97112 NEUROMUSCULAR REEDUCATION: CPT | Performed by: PHYSICAL THERAPIST

## 2024-12-13 PROCEDURE — 97161 PT EVAL LOW COMPLEX 20 MIN: CPT | Performed by: PHYSICAL THERAPIST

## 2024-12-13 PROCEDURE — 97110 THERAPEUTIC EXERCISES: CPT | Performed by: PHYSICAL THERAPIST

## 2024-12-13 NOTE — PLAN OF CARE
(02258) including: functional mobility training and education.  Neuromuscular Re-education (01880) activation and proprioception, including postural re-education.    Gait Training (54541) for normalization of ambulation patterns and AD training.   Manual Therapy (05220) as indicated to include: Passive Range of Motion, Gr I-IV mobilizations, Grade V Manipulation, and Soft Tissue Mobilization  Modalities as needed that may include: Cryotherapy, Electrical Stimulation, and Thermal Agents  Patient education on joint protection, postural re-education, activity modification, and progression of HEP    Plan: POC initiated as per evaluation. Patient to call in January to schedule additional visits.    Electronically Signed by Lindsay Nguyen PT  Date: 12/13/2024     Note: Portions of this note have been templated and/or copied from initial evaluation, reassessments and prior notes for documentation efficiency.    Note: If patient does not return for scheduled/recommended follow up visits, this note will serve as a discharge from care along with the most recent update on progress.    Ortho Evaluation

## 2025-06-09 NOTE — TELEPHONE ENCOUNTER
Colonoscopy completed. Pt returned to ICU 3916 from PACU. Pt sleepy, arouses easily. Transferred from cart to bed with assistance X2.   Same Day Appt Add On Time     Appointment time:  2:00